# Patient Record
Sex: MALE | Race: WHITE | NOT HISPANIC OR LATINO | Employment: FULL TIME | URBAN - METROPOLITAN AREA
[De-identification: names, ages, dates, MRNs, and addresses within clinical notes are randomized per-mention and may not be internally consistent; named-entity substitution may affect disease eponyms.]

---

## 2018-12-21 ENCOUNTER — TELEPHONE (OUTPATIENT)
Dept: ENDOSCOPY | Facility: HOSPITAL | Age: 53
End: 2018-12-21

## 2018-12-21 DIAGNOSIS — R93.89 ABNORMAL FINDING ON IMAGING: Primary | ICD-10-CM

## 2018-12-21 NOTE — TELEPHONE ENCOUNTER
Claribel,   I spoke with patient and wife. Scheduled EUS for 12/28 at 11a with Dr Colt Swain. I put a hold on the schedule.  Norma

## 2018-12-21 NOTE — TELEPHONE ENCOUNTER
----- Message from Arron Massey MD sent at 12/21/2018 12:52 PM CST -----  Contact: Darin Crowe MD  Please schedule for EUS within next 1-2 weeks. Can be with any of us. Can be at [facility] or [place]. 60 min case.      ----- Message -----  From: Joanne Messer MA  Sent: 12/21/2018  12:34 PM  To: Arron Massey MD        ----- Message -----  From: Gisel Cyr  Sent: 12/21/2018  11:56 AM  To: Bryson SUTHERLAND Staff [facility]    Good afternoon, Dr. Crowe would like to refer the following patient to Dr. Arron Massey for an EUS as soon as possible. The patients diagnosis is pancreatic lesion and liver lesion. I have scanned the patients referral and records into media manager. If there are any further questions in regards to the patient, please contact Dr. Crowe's office at, [phone]. Also, my extension is [phone].   Please let me know if I can help schedule in any way.  Thank you,   Gisel

## 2018-12-21 NOTE — TELEPHONE ENCOUNTER
----- Message from Arron Massey MD sent at 12/21/2018  1:40 PM CST -----  Contact: MD Norma Bran- I just got off the phone with Dr. Crowe. Pt is very anxious. We may need to get him in sometime next week.      ----- Message -----  From: Arron Massey MD  Sent: 12/21/2018  12:52 PM  To: Joanne Messer MA    Please schedule for EUS within next 1-2 weeks. Can be with any of us. Can be at [place] or [place]. 60 min case.      ----- Message -----  From: Joanne Messer MA  Sent: 12/21/2018  12:34 PM  To: Arron Massey MD        ----- Message -----  From: Gisel Cyr  Sent: 12/21/2018  11:56 AM  To: Bryson SUTHERLAND Staff [facility]    Good afternoon, Dr. Crowe would like to refer the following patient to Dr. Arron Massey for an EUS as soon as possible. The patients diagnosis is pancreatic lesion and liver lesion. I have scanned the patients referral and records into . If there are any further questions in regards to the patient, please contact Dr. Crowe's office at, [phone]. Also, my extension is [phone].   Please let me know if I can help schedule in any way.  Thank you,   Gisel

## 2018-12-26 ENCOUNTER — TELEPHONE (OUTPATIENT)
Dept: ENDOSCOPY | Facility: HOSPITAL | Age: 53
End: 2018-12-26

## 2018-12-26 NOTE — TELEPHONE ENCOUNTER
Spoke with patient about 1000 arrival time. Clear liquids past 7pm the day before the procedure. NPO past midnight. Please take blood pressure, heart seizure medication the morning of the procedure. Hold all diabetic medication the morning of the procedure. Leave all valuable at home.  Please have a ride available the day of the procedure.

## 2018-12-27 ENCOUNTER — ANESTHESIA EVENT (OUTPATIENT)
Dept: ENDOSCOPY | Facility: HOSPITAL | Age: 53
End: 2018-12-27
Payer: COMMERCIAL

## 2018-12-28 ENCOUNTER — HOSPITAL ENCOUNTER (OUTPATIENT)
Facility: HOSPITAL | Age: 53
Discharge: HOME | End: 2018-12-28
Attending: INTERNAL MEDICINE | Admitting: INTERNAL MEDICINE
Payer: COMMERCIAL

## 2018-12-28 ENCOUNTER — ANESTHESIA (OUTPATIENT)
Dept: ENDOSCOPY | Facility: HOSPITAL | Age: 53
End: 2018-12-28
Payer: COMMERCIAL

## 2018-12-28 VITALS
OXYGEN SATURATION: 97 % | RESPIRATION RATE: 18 BRPM | HEART RATE: 77 BPM | WEIGHT: 213 LBS | SYSTOLIC BLOOD PRESSURE: 106 MMHG | TEMPERATURE: 99 F | BODY MASS INDEX: 34.23 KG/M2 | HEIGHT: 66 IN | DIASTOLIC BLOOD PRESSURE: 70 MMHG

## 2018-12-28 DIAGNOSIS — K85.90 PANCREATITIS: ICD-10-CM

## 2018-12-28 DIAGNOSIS — K86.9 PANCREATIC LESION: ICD-10-CM

## 2018-12-28 DIAGNOSIS — Z01.818 PREOP EXAMINATION: ICD-10-CM

## 2018-12-28 DIAGNOSIS — K85.90 ACUTE PANCREATITIS, UNSPECIFIED COMPLICATION STATUS, UNSPECIFIED PANCREATITIS TYPE: Primary | ICD-10-CM

## 2018-12-28 PROCEDURE — 88305 TISSUE EXAM BY PATHOLOGIST: ICD-10-PCS | Mod: 26,ICN,, | Performed by: PATHOLOGY

## 2018-12-28 PROCEDURE — 25000003 PHARM REV CODE 250: Performed by: NURSE ANESTHETIST, CERTIFIED REGISTERED

## 2018-12-28 PROCEDURE — 93010 EKG 12-LEAD: ICD-10-PCS | Mod: ,,, | Performed by: STUDENT IN AN ORGANIZED HEALTH CARE EDUCATION/TRAINING PROGRAM

## 2018-12-28 PROCEDURE — 37000008 HC ANESTHESIA 1ST 15 MINUTES: Performed by: INTERNAL MEDICINE

## 2018-12-28 PROCEDURE — 93005 ELECTROCARDIOGRAM TRACING: CPT

## 2018-12-28 PROCEDURE — 43242 EGD US FINE NEEDLE BX/ASPIR: CPT | Performed by: INTERNAL MEDICINE

## 2018-12-28 PROCEDURE — 88173 CYTOLOGY SPECIMEN- FNA RADIOLOGY GUIDED, BRONCH/EBUS, EUS/GI: ICD-10-PCS | Mod: 26,ICN,, | Performed by: PATHOLOGY

## 2018-12-28 PROCEDURE — 88172 CYTOLOGY SPECIMEN- FNA RADIOLOGY GUIDED, BRONCH/EBUS, EUS/GI: ICD-10-PCS | Mod: 26,ICN,, | Performed by: PATHOLOGY

## 2018-12-28 PROCEDURE — 88173 CYTOPATH EVAL FNA REPORT: CPT | Mod: 26,ICN,, | Performed by: PATHOLOGY

## 2018-12-28 PROCEDURE — 63600175 PHARM REV CODE 636 W HCPCS: Performed by: NURSE ANESTHETIST, CERTIFIED REGISTERED

## 2018-12-28 PROCEDURE — 88305 TISSUE EXAM BY PATHOLOGIST: CPT | Mod: 26,ICN,, | Performed by: PATHOLOGY

## 2018-12-28 PROCEDURE — 93010 ELECTROCARDIOGRAM REPORT: CPT | Mod: ,,, | Performed by: STUDENT IN AN ORGANIZED HEALTH CARE EDUCATION/TRAINING PROGRAM

## 2018-12-28 PROCEDURE — 27202059 HC NEEDLE, FNA (ANY): Performed by: INTERNAL MEDICINE

## 2018-12-28 PROCEDURE — 88172 CYTP DX EVAL FNA 1ST EA SITE: CPT | Mod: 26,ICN,, | Performed by: PATHOLOGY

## 2018-12-28 PROCEDURE — 43242 EGD US FINE NEEDLE BX/ASPIR: CPT | Mod: ,,, | Performed by: INTERNAL MEDICINE

## 2018-12-28 PROCEDURE — 37000009 HC ANESTHESIA EA ADD 15 MINS: Performed by: INTERNAL MEDICINE

## 2018-12-28 PROCEDURE — 88305 TISSUE EXAM BY PATHOLOGIST: CPT | Performed by: PATHOLOGY

## 2018-12-28 PROCEDURE — 43242 PR UPGI ENDOSCOPY,FN NEEDLE BX,GUIDED: ICD-10-PCS | Mod: ,,, | Performed by: INTERNAL MEDICINE

## 2018-12-28 PROCEDURE — 25000003 PHARM REV CODE 250: Performed by: INTERNAL MEDICINE

## 2018-12-28 RX ORDER — ATENOLOL 50 MG/1
50 TABLET ORAL DAILY
COMMUNITY

## 2018-12-28 RX ORDER — SODIUM CHLORIDE 9 MG/ML
INJECTION, SOLUTION INTRAVENOUS CONTINUOUS
Status: DISCONTINUED | OUTPATIENT
Start: 2018-12-28 | End: 2018-12-28 | Stop reason: HOSPADM

## 2018-12-28 RX ORDER — LIDOCAINE HCL/PF 100 MG/5ML
SYRINGE (ML) INTRAVENOUS
Status: DISCONTINUED | OUTPATIENT
Start: 2018-12-28 | End: 2018-12-28

## 2018-12-28 RX ORDER — SODIUM CHLORIDE 0.9 % (FLUSH) 0.9 %
3 SYRINGE (ML) INJECTION
Status: DISCONTINUED | OUTPATIENT
Start: 2018-12-28 | End: 2018-12-28 | Stop reason: HOSPADM

## 2018-12-28 RX ORDER — PROPOFOL 10 MG/ML
VIAL (ML) INTRAVENOUS CONTINUOUS PRN
Status: DISCONTINUED | OUTPATIENT
Start: 2018-12-28 | End: 2018-12-28

## 2018-12-28 RX ORDER — LISINOPRIL 10 MG/1
10 TABLET ORAL DAILY
COMMUNITY

## 2018-12-28 RX ORDER — SUCRALFATE 1 G/10ML
1 SUSPENSION ORAL 4 TIMES DAILY
COMMUNITY

## 2018-12-28 RX ORDER — ESOMEPRAZOLE MAGNESIUM 40 MG/1
40 CAPSULE, DELAYED RELEASE ORAL
COMMUNITY

## 2018-12-28 RX ORDER — PROPOFOL 10 MG/ML
VIAL (ML) INTRAVENOUS
Status: DISCONTINUED | OUTPATIENT
Start: 2018-12-28 | End: 2018-12-28

## 2018-12-28 RX ADMIN — TOPICAL ANESTHETIC 1 EACH: 200 SPRAY DENTAL; PERIODONTAL at 11:12

## 2018-12-28 RX ADMIN — PROPOFOL 30 MG: 10 INJECTION, EMULSION INTRAVENOUS at 11:12

## 2018-12-28 RX ADMIN — PROPOFOL 70 MG: 10 INJECTION, EMULSION INTRAVENOUS at 11:12

## 2018-12-28 RX ADMIN — SODIUM CHLORIDE: 0.9 INJECTION, SOLUTION INTRAVENOUS at 10:12

## 2018-12-28 RX ADMIN — PROPOFOL 200 MCG/KG/MIN: 10 INJECTION, EMULSION INTRAVENOUS at 11:12

## 2018-12-28 RX ADMIN — LIDOCAINE HYDROCHLORIDE 100 MG: 20 INJECTION, SOLUTION INTRAVENOUS at 11:12

## 2018-12-28 NOTE — H&P
History & Physical - Short Stay  Gastroenterology      SUBJECTIVE:     Procedure: EUS    Chief Complaint/Indication for Procedure: abnormal imaging    History of Present Illness:  Patient is a 53 y.o. male with abnormal imaging of the pancreas coming for EUS-FNA    PTA Medications   Medication Sig    atenolol (TENORMIN) 50 MG tablet Take 50 mg by mouth once daily.    esomeprazole (NEXIUM) 40 MG capsule Take 40 mg by mouth before breakfast.    lisinopril 10 MG tablet Take 10 mg by mouth once daily.    sucralfate (CARAFATE) 100 mg/mL suspension Take 1 g by mouth 4 (four) times daily.       Review of patient's allergies indicates:  No Known Allergies     Past Medical History:   Diagnosis Date    Hypertension     Sleep apnea      Past Surgical History:   Procedure Laterality Date    CYST REMOVAL       History reviewed. No pertinent family history.  Social History     Tobacco Use    Smoking status: Never Smoker    Smokeless tobacco: Never Used   Substance Use Topics    Alcohol use: No     Frequency: Never    Drug use: No          OBJECTIVE:     Vital Signs (Most Recent)  Temp: 99.1 °F (37.3 °C) (12/28/18 1027)  Pulse: 86 (12/28/18 1027)  Resp: 20 (12/28/18 1027)  BP: 125/67 (12/28/18 1027)  SpO2: 97 % (12/28/18 1027)         ASSESSMENT/PLAN:     Patient is a 53 y.o. male with abnormal imaging of the pancreas coming for EUS-FNA    Plan: EUS    Anesthesia Plan: Moderate Sedation    ASA Grade: ASA 2 - Patient with mild systemic disease with no functional limitations

## 2018-12-28 NOTE — ANESTHESIA PREPROCEDURE EVALUATION
12/28/2018  Michele Tapia is a 53 y.o., male for upper EUS under GA. Pt is obese with sleep apnea.    Past Medical History:   Diagnosis Date    Hypertension     Sleep apnea          Anesthesia Evaluation    I have reviewed the Patient Summary Reports.    I have reviewed the Nursing Notes.   I have reviewed the Medications.     Review of Systems  Anesthesia Hx:   Denies Personal Hx of Anesthesia complications.   Social:  Non-Smoker, No Alcohol Use    Cardiovascular:   Hypertension ECG has been reviewed.    Pulmonary:   Sleep Apnea    Hepatic/GI:   Liver Disease,        Physical Exam  General:  Well nourished, Obesity    Airway/Jaw/Neck:  Airway Findings: Mallampati: I      Chest/Lungs:  Chest/Lungs Clear    Heart/Vascular:  Heart Findings: Normal              Anesthesia Plan  Type of Anesthesia, risks & benefits discussed:  Anesthesia Type:  MAC, general  Patient's Preference:   Intra-op Monitoring Plan:   Intra-op Monitoring Plan Comments:   Post Op Pain Control Plan:   Post Op Pain Control Plan Comments:   Induction:    Beta Blocker:  Patient is on a Beta-Blocker and has received one dose within the past 24 hours (No further documentation required).       Informed Consent: Patient understands risks and agrees with Anesthesia plan.  Questions answered. Anesthesia consent signed with patient.  ASA Score: 2     Day of Surgery Review of History & Physical:            Ready For Surgery From Anesthesia Perspective.

## 2018-12-28 NOTE — ANESTHESIA POSTPROCEDURE EVALUATION
"Anesthesia Post Evaluation    Patient: Michele Tapia    Procedure(s) Performed: Procedure(s) (LRB):  ULTRASOUND, UPPER GI TRACT, ENDOSCOPIC (N/A)    Final Anesthesia Type: MAC  Patient location during evaluation: GI PACU  Patient participation: Yes- Able to Participate  Level of consciousness: awake and alert  Post-procedure vital signs: reviewed and stable  Pain management: adequate  Airway patency: patent  PONV status at discharge: No PONV  Anesthetic complications: no      Cardiovascular status: hemodynamically stable  Respiratory status: unassisted, spontaneous ventilation and room air  Hydration status: euvolemic  Follow-up not needed.        Visit Vitals  /67 (Patient Position: Lying)   Pulse 86   Temp 37.3 °C (99.1 °F) (Temporal)   Resp 20   Ht 5' 6" (1.676 m)   Wt 96.6 kg (213 lb)   SpO2 97%   BMI 34.38 kg/m²       Pain/Jocelyn Score: No Data Recorded      "

## 2018-12-28 NOTE — [employer] PATIENT INSTRUCTIONS
Discharge Summary/Instructions after an Endoscopic Procedure  Patient Name: Michele Tapia  Patient MRN: [id number]  Patient YOB: 1965  Friday, December 28, 2018  Tiago Swain MD  RESTRICTIONS:  During your procedure today, you received medications for sedation.  These   medications may affect your judgment, balance and coordination.  Therefore,   for 24 hours, you have the following restrictions:   - DO NOT drive a car, operate machinery, make legal/financial decisions,   sign important papers or drink alcohol.    ACTIVITY:  Today: no heavy lifting, straining or running due to procedural   sedation/anesthesia.  The following day: return to full activity including work.  DIET:  Eat and drink normally unless instructed otherwise.     TREATMENT FOR COMMON SIDE EFFECTS:  - Mild abdominal pain, nausea, belching, bloating or excessive gas:  rest,   eat lightly and use a heating pad.  - Sore Throat: treat with throat lozenges and/or gargle with warm salt   water.  - Because air was used during the procedure, expelling large amounts of air   from your rectum or belching is normal.  - If a bowel prep was taken, you may not have a bowel movement for 1-3 days.    This is normal.  SYMPTOMS TO WATCH FOR AND REPORT TO YOUR PHYSICIAN:  1. Abdominal pain or bloating, other than gas cramps.  2. Chest pain.  3. Back pain.  4. Signs of infection such as: chills or fever occurring within 24 hours   after the procedure.  5. Rectal bleeding, which would show as bright red, maroon, or black stools.   (A tablespoon of blood from the rectum is not serious, especially if   hemorrhoids are present.)  6. Vomiting.  7. Weakness or dizziness.  GO DIRECTLY TO THE NEAREST EMERGENCY ROOM IF YOU HAVE ANY OF THE FOLLOWING:      Difficulty breathing              Chills and/or fever over 101 F   Persistent vomiting and/or vomiting blood   Severe abdominal pain   Severe chest pain   Black, tarry stools   Bleeding- more than one  tablespoon   Any other symptom or condition that you feel may need urgent attention  Your doctor recommends these additional instructions:  If any biopsies were taken, your doctors clinic will contact you in 1 to 2   weeks with any results.  - Discharge patient to home.   - Resume previous diet.   - Continue present medications.   - Return to referring physician.   - Refer to oncology.   - Await cytology results.  For questions, problems or results please call your physician - Tiago Sawin MD at Work:  [phone].  EMERGENCY PHONE NUMBER: [phone],  LAB RESULTS: [phone]  IF A COMPLICATION OR EMERGENCY SITUATION ARISES AND YOU ARE UNABLE TO REACH   YOUR PHYSICIAN - GO DIRECTLY TO THE EMERGENCY ROOM.  Tiago Swain MD  12/28/2018 12:49:42 PM  This report has been verified and signed electronically.  [employer]

## 2018-12-28 NOTE — TRANSFER OF CARE
"Anesthesia Transfer of Care Note    Patient: Michele Tapia    Procedure(s) Performed: Procedure(s) (LRB):  ULTRASOUND, UPPER GI TRACT, ENDOSCOPIC (N/A)    Patient location: GI    Anesthesia Type: MAC    Transport from OR: Transported from OR on room air with adequate spontaneous ventilation    Post pain: adequate analgesia    Post assessment: no apparent anesthetic complications and tolerated procedure well    Post vital signs: stable    Level of consciousness: responds to stimulation and sedated    Nausea/Vomiting: no nausea/vomiting    Complications: none    Transfer of care protocol was followed      Last vitals:   Visit Vitals  /67 (Patient Position: Lying)   Pulse 86   Temp 37.3 °C (99.1 °F) (Temporal)   Resp 20   Ht 5' 6" (1.676 m)   Wt 96.6 kg (213 lb)   SpO2 97%   BMI 34.38 kg/m²     "

## 2019-01-03 ENCOUNTER — TELEPHONE (OUTPATIENT)
Dept: ENDOSCOPY | Facility: HOSPITAL | Age: 54
End: 2019-01-03

## 2019-01-03 NOTE — TELEPHONE ENCOUNTER
----- Message from Jacque Christensen sent at 1/3/2019 11:42 AM CST -----  Contact: pt#[phone]  Test Results    Type of Test:scope/biopsy   Date of Test:12/28  Communication Preference:call  Additional Information: